# Patient Record
Sex: FEMALE | Race: BLACK OR AFRICAN AMERICAN | URBAN - METROPOLITAN AREA
[De-identification: names, ages, dates, MRNs, and addresses within clinical notes are randomized per-mention and may not be internally consistent; named-entity substitution may affect disease eponyms.]

---

## 2017-06-13 ENCOUNTER — HOSPITAL ENCOUNTER (OUTPATIENT)
Dept: OTHER | Age: 27
Discharge: HOME | End: 2017-06-13
Attending: SURGERY | Admitting: SURGERY

## 2017-06-13 ENCOUNTER — OFFICE VISIT (OUTPATIENT)
Dept: SURGERY | Age: 27
End: 2017-06-13

## 2017-06-13 VITALS
OXYGEN SATURATION: 98 % | HEART RATE: 106 BPM | WEIGHT: 80 LBS | BODY MASS INDEX: 18.52 KG/M2 | TEMPERATURE: 99.7 F | HEIGHT: 55 IN

## 2017-06-13 DIAGNOSIS — N62 MACROMASTIA: Primary | ICD-10-CM

## 2017-06-13 DIAGNOSIS — Q67.7 CONGENITAL PECTUS CARINATUM: ICD-10-CM

## 2017-06-13 PROCEDURE — 99204 OFFICE O/P NEW MOD 45 MIN: CPT | Performed by: SURGERY

## 2017-06-13 ASSESSMENT — ENCOUNTER SYMPTOMS: EYES NEGATIVE: 1

## 2017-10-10 ENCOUNTER — TELEPHONE (OUTPATIENT)
Dept: SURGERY | Age: 27
End: 2017-10-10

## 2017-10-10 NOTE — TELEPHONE ENCOUNTER
Patient's mom called and states she has not heard from our office regarding the next step in the plan for her daughter, Lazarus Infield. Informed her that per Dr Aldana [place] note, we were waiting on the imaging to determine a plan. Patient had Herrera Fuentes completed on 6/13/17. Apologized to Chai Rodríguez for the delay and I will find out from Dr Jacqueline Albrecht what the next step is and call her. Eva pachecoable.

## 2017-10-12 ENCOUNTER — TELEPHONE (OUTPATIENT)
Dept: SURGERY | Age: 27
End: 2017-10-12

## 2017-10-12 NOTE — TELEPHONE ENCOUNTER
Patient's mother, Robert Morgan states October 23, 2017 @ 12:00 pm will work for Monica's appointment.

## 2017-10-18 ENCOUNTER — TELEPHONE (OUTPATIENT)
Dept: SURGERY | Age: 27
End: 2017-10-18

## 2017-10-18 NOTE — TELEPHONE ENCOUNTER
FYI-Patient's appointment for 10/23/17 was changed from 12:00 to 2:00. Patient is disabled and has a tooth coming in that is painful. The soonest dental appointment they could schedule is for 10/23/17 @ 12:30. If the appointment needs changed please contact Chai Rodríguez @ [phone].

## 2017-10-23 ENCOUNTER — OFFICE VISIT (OUTPATIENT)
Dept: SURGERY | Age: 27
End: 2017-10-23

## 2017-10-23 VITALS — RESPIRATION RATE: 15 BRPM | HEART RATE: 96 BPM | BODY MASS INDEX: 18.52 KG/M2 | HEIGHT: 55 IN | WEIGHT: 80 LBS

## 2017-10-23 DIAGNOSIS — Q67.7 CONGENITAL PECTUS CARINATUM: ICD-10-CM

## 2017-10-23 DIAGNOSIS — N62 MACROMASTIA: Primary | ICD-10-CM

## 2017-10-23 PROCEDURE — 99213 OFFICE O/P EST LOW 20 MIN: CPT | Performed by: SURGERY

## 2017-10-23 ASSESSMENT — ENCOUNTER SYMPTOMS
SHORTNESS OF BREATH: 0
SORE THROAT: 0
WHEEZING: 0
COUGH: 0

## 2017-10-23 NOTE — PROGRESS NOTES
[facility]    CC:   Chief Complaint   Patient presents with    Follow-up     REFERRING PHYSICIAN: Dr Otoniel Rivas    HPI: This is a 32 y.o.  female who presents to clinic with desire for breast reduction. Patient's history was obtained through her mother due to a mental disability. 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 Despite her disability, she has not had substantial medical problems. As her scoliosis has worsened, her left breast has grown disproportionately larger and has had multiple episodes of skin breakdown as it rubs against her arm and hip. She was referred to plastics for potential discussion regarding unilateral reduction mammaplasty. She is here today to follow up and discuss the plan. Her pertinent breast history include the following:     Last Mammogram: NA     Current bra size: C (right) D (left)  Desired bra size: Make the breast size smaller   Pregnancies/miscarriages: 0/0  Breast feeding: no future plans     Breast Symptoms:     Macromastia Symptoms:              Upper back pain: Unable to obtain as patient is essentially nonverbal                                                          Bra strap grooves: Wears pull over bras                                                          Wears supportive bras (>1 yr): Wears Genie Bras (pullover)                                              Tried conservative measures (PT, MDs, etc):  Yes                                                         Intertrigo: Yes--under left breast - hits her thighs while in her bed                                                          Head/neck pain: Unable to obtain as patient is nonverbal                                                          Headaches: Unable to obtain as patient is nonverbal                                                          Paresthesias of hands/fingers: Unable to obtain as patient is nonverbal      PMHx: Chromosomal abnormality NOS  PSHx:   Past Surgical History:   Procedure Laterality Date    APPENDECTOMY      CLEFT LIP REPAIR      CLEFT PALATE REPAIR       ALLERGIES: No Known Allergies  SOCIAL: No tobacco, ETOH, IVD  FHx:  Past history of breast CA: No   Past family members with breast reduction: Yes; maternal aunt    Past family members with breast augmentation: No    Meds:   Current Outpatient Prescriptions   Medication Sig Dispense Refill    Perampanel (FYCOMPA) 8 MG TABS Take by mouth      Cetirizine HCl (ZYRTEC ALLERGY PO) Take by mouth      Polyethylene Glycol 3350 (MIRALAX PO) Take by mouth      VITAMIN D, CHOLECALCIFEROL, PO Take by mouth       No current facility-administered medications for this visit. ROS  Review of Systems   Constitutional: Negative for chills, fever and weight loss. HENT: Negative for congestion, hearing loss and sore throat. Respiratory: Negative for cough, shortness of breath and wheezing. Skin: Negative for itching and rash.          EXAM     GEN: NAD  CVS: RRR  PULM: No respiratory distress  BREAST: Left larger than Right; moderate pectus carinatum appreciated                        L                                          Ptosis ndgndrndanddndend:nd nd2nd Palpable masses: No                                                                    Nipple retraction: No                                                                    Palpable axillary lymphadenopathy: No                                                                    SN-N: 23.5 cm                                                                    N-IMF: 8.8 cm                                                                    Breast width: 12.5 cm                                                                    Rash along inferior and lateral aspect                                                R

## 2017-11-07 ENCOUNTER — TELEPHONE (OUTPATIENT)
Dept: SURGERY | Age: 27
End: 2017-11-07

## 2017-11-07 NOTE — LETTER
remain there for your surgery. If this arrangement has not been made at the time of your surgery, your surgery may be cancelled  2. You should not be left alone for 24  48 hours following surgery. 3. You should not drive, sign any important documents or make any important decisions until you have fully recovered from anesthesia (approximately 24  48 hours) AND are off all narcotic pain medications. · Any paperwork needing completion for either your employer or insurance company can be faxed, mailed or dropped off at our office to my attention. Please allow 7 business days for the paperwork to be completed. You will be contacted once it has been completed at which time you will be able to pick it up or have it mailed. · NOTE: Due to HIPPA policy, completed paperwork can not be faxed and per DEEPIKA MORELAND [facility] of 9-2-12, Fees for form completion may apply. If you have any questions, please feel free to call me at the number above.

## 2017-11-07 NOTE — TELEPHONE ENCOUNTER
Per Gary Chau at [facility], CPT code 65854 requires a prior authorization. Send clinicals + photos to fax   # [phone]. Include reference # [id number] on cover sheet.

## 2017-11-14 NOTE — TELEPHONE ENCOUNTER
Rcvd letter from [facility] asking for additional clinical information. (Letter scanned in to Epic.)   Requested information needs to be supplied by calling [facility] at # [phone].

## 2017-11-16 NOTE — TELEPHONE ENCOUNTER
Called and spoke with Maddy at [address]. She informed me that since the patient's surgery is outpatient, there is not a nurse to speak with for clinicals. Rubia Horner stated we may send in clinicals to [phone] and put the pending ref # in the subject line. Attention to Utilization Management Department. Routing to [facility].

## 2018-01-03 NOTE — PROGRESS NOTES
[facility] / [facility] [place] ([facility]) [address] [facility], [address]    Acknowledgment of Informed Consent for Surgical or Medical Procedure and Sedation  I agree to allow doctor(s) Татьяна Merino and his/her associates or assistants, including residents and/or other qualified medical practitioner to perform the following medical treatment or procedure and to administer or direct the administration of sedation as necessary:  Procedure(s): LEFT BREAST REDUCTION  My doctor has explained the following regarding the proposed procedure:   the explanation of the procedure   the benefits of the procedure   the potential problems that might occur during recuperation   the risks and side effects of the procedure which could include but are not limited to severe blood loss, infection, stroke or death   the benefits, risks and side effect of alternative procedures including the consequences of declining this procedure or any alternative procedures   the likelihood of achieving satisfactory results. I acknowledge no guarantee or assurance has been made to me regarding the results. I understand that during the course of this treatment/procedure, unforeseen conditions can occur which require an additional or different procedure. I agree to allow my physician or assistants to perform such extension of the original procedure as they may find necessary. I understand that sedation will often result in temporary impairment of memory and fine motor skills and that sedation can occasionally progress to a state of deep sedation or general anesthesia. I understand the risks of anesthesia for surgery include, but are not limited to, sore throat, hoarseness, injury to face, mouth, or teeth; nausea; headache; injury to blood vessels or nerves; death, brain damage, or paralysis.     I understand that if I have a Limitation of Treatment order in effect during my hospitalization, the order may or may not be in

## 2018-01-09 ENCOUNTER — TELEPHONE (OUTPATIENT)
Dept: SURGERY | Age: 28
End: 2018-01-09

## 2018-01-15 ENCOUNTER — HOSPITAL ENCOUNTER (OUTPATIENT)
Dept: PREADMISSION TESTING | Age: 28
Discharge: HOME | End: 2018-02-04
Attending: SURGERY | Admitting: SURGERY

## 2018-01-15 ENCOUNTER — TELEPHONE (OUTPATIENT)
Dept: SURGERY | Age: 28
End: 2018-01-15

## 2018-01-15 NOTE — TELEPHONE ENCOUNTER
Mom Emilia Sunshine) calling about status of rescheduling left breast reduction for patient. Please call her @ [phone].

## 2018-01-17 NOTE — TELEPHONE ENCOUNTER
Patient is re-scheduled for surgery. It will be Friday 2/9/18 at noon (arrival time 10:00 am) at [address] over all info with Blayne Staley. Mailed letter with dates/times/instructions to patient's home address.

## 2018-01-25 ENCOUNTER — TELEPHONE (OUTPATIENT)
Dept: SURGERY | Age: 28
End: 2018-01-25

## 2018-02-09 ENCOUNTER — HOSPITAL ENCOUNTER (OUTPATIENT)
Dept: SURGERY | Age: 28
Discharge: HOME | End: 2018-02-28
Admitting: SURGERY

## 2024-04-15 NOTE — Clinical Note
"Recommended To-Do List      Prepared on: 04/18/2024       You can get the best results from your medications by completing the items on this \"To-Do List.\"      Bring your To-Do List when you go to your doctor. And, share it with your family or caregivers.    My To-Do List:  What we talked about: What I should do:   An issue with your medication    Change the medication you are taking from Lantus SoloStar to HUMALOG KWIKPEN or NOVOLOG KWIKPEN.     Semglee and Lantus are both long acting insulin products called insulin glargine. Please reach out to your endocrinologist to start a mealtime/short acting insulin such as Humalog or Novolog in addition to Semglee. Stop using Lantus on a sliding scale.           What we talked about: What I should do:                     " Will submit to insurance for unilateral reduction for her symptom control. Plan for surgery probably in January. Thanks!!  Paddy Wesley